# Patient Record
Sex: FEMALE | Race: AMERICAN INDIAN OR ALASKA NATIVE
[De-identification: names, ages, dates, MRNs, and addresses within clinical notes are randomized per-mention and may not be internally consistent; named-entity substitution may affect disease eponyms.]

---

## 2018-02-01 ENCOUNTER — HOSPITAL ENCOUNTER (OUTPATIENT)
Dept: HOSPITAL 5 - CT | Age: 43
Discharge: HOME | End: 2018-02-01
Attending: PAIN MEDICINE
Payer: MEDICARE

## 2018-02-01 DIAGNOSIS — R91.8: ICD-10-CM

## 2018-02-01 DIAGNOSIS — M54.2: ICD-10-CM

## 2018-02-01 DIAGNOSIS — M54.16: ICD-10-CM

## 2018-02-01 DIAGNOSIS — M47.894: Primary | ICD-10-CM

## 2018-02-01 PROCEDURE — 72131 CT LUMBAR SPINE W/O DYE: CPT

## 2018-02-01 PROCEDURE — 72128 CT CHEST SPINE W/O DYE: CPT

## 2018-02-01 PROCEDURE — 72125 CT NECK SPINE W/O DYE: CPT

## 2018-02-01 NOTE — CAT SCAN REPORT
FINAL REPORT



PROCEDURE:  CT CERVICAL SPINE WO CON



TECHNIQUE:  Computerized tomography of the cervical spine was

performed from the skull base to T1 without contrast material. 



HISTORY:  CERVICALGIA 



COMPARISON:  No prior studies are available for comparison.



FINDINGS:  

Vertebral alignment and height are within normal limits.

Visualized lung apices are clear. Soft tissues of the neck are

unremarkable. 



C1-2: No significant abnormality.



C2-3: No significant abnormality.



C3-4: No significant abnormality.



C4-5: No significant abnormality.



C5-6: No significant abnormality.



C6-7: No significant abnormality.



C7-T1: No significant abnormality.



Other: No additional findings.



IMPRESSION:  

No acute fracture.



Unremarkable cervical spine..

## 2018-02-01 NOTE — CAT SCAN REPORT
FINAL REPORT



PROCEDURE:  CT THORACIC SPINE WO CON



TECHNIQUE:  Computerized axial tomography of the thoracic spine

was performed from C7 - L1 without contrast material. .68

mGy-cm.



HISTORY:  Pain thoracic spine. 



COMPARISON:  No prior studies are available for comparison.



FINDINGS:  

Normal alignment. Tiny multilevel osteophytes. Intrathecal leads,

tip at the T10 level. Loop in the mid visualized lead in the

subcutaneous tissues. At the T6-7 level there is minimal

posterior spurring with slight thecal sac effacement. 



2.8 mm right apical ill-defined ground-glass nodule (image 44

series 3). 2 mm left upper lobe nodule (image 55 series 3).

Triangular anterior mediastinal soft tissue attenuation. 



IMPRESSION:  

Mild degenerative changes of the thoracic spine as described

above. Intrathecal leads.



Triangular anterior mediastinal soft tissue attenuation. Consider

reactive thymic tissue, cannot exclude anterior mediastinal

process such as thymoma. There are also indeterminate pulmonary

nodules. Consider dedicated chest CT if there is continued

clinical concern.

## 2018-02-01 NOTE — CAT SCAN REPORT
FINAL REPORT



EXAM:  CT LUMBAR SPINE WO CON



HISTORY:  LUMBAR RADICULOPATHY 



TECHNIQUE:  Axial noncontrast CT images of the lumbosacral spine

were performed. Multiplanar reformats are performed on the

acquisition scanner.



Total exam DLP 1031.79 mGy-cm



Comparison: None



FINDINGS:  

There is normal lumbar lordosis.



There is minimal retrolisthesis L5 on S1.



There are no compression fractures.



Pedicles and facets are intact.



There is a lumbar epidural pain catheter entering the epidural

space posteriorly at T12/L1.



There is no significant degenerative disease.



Axial images were obtained from bottom of T12 to the sacrum.



The epidural catheter is present.



There is no significant canal stenosis or foraminal stenosis at

any level. There is broad-based bulge at L5/S1.



Limited images of the retroperitoneum demonstrate multiple small

retroperitoneal lymph nodes. Normally anteverted uterus

incompletely imaged.



IMPRESSION:  

Lumbar epidural pain catheter present entering at the T12/L1

posterior space.



L5/S1 minimal retrolisthesis with broad-based bulge.



No significant degenerative disease at any level. 



No canal stenosis or foraminal stenosis identified.



No compression fracture.

## 2023-07-14 ENCOUNTER — DASHBOARD ENCOUNTERS (OUTPATIENT)
Age: 48
End: 2023-07-14

## 2023-07-14 ENCOUNTER — TELEPHONE ENCOUNTER (OUTPATIENT)
Dept: URBAN - METROPOLITAN AREA CLINIC 44 | Facility: CLINIC | Age: 48
End: 2023-07-14

## 2023-07-14 ENCOUNTER — OFFICE VISIT (OUTPATIENT)
Dept: URBAN - METROPOLITAN AREA CLINIC 46 | Facility: CLINIC | Age: 48
End: 2023-07-14
Payer: MEDICARE

## 2023-07-14 VITALS
DIASTOLIC BLOOD PRESSURE: 91 MMHG | HEART RATE: 87 BPM | HEIGHT: 62 IN | TEMPERATURE: 97.3 F | SYSTOLIC BLOOD PRESSURE: 144 MMHG | BODY MASS INDEX: 30.69 KG/M2 | WEIGHT: 166.8 LBS

## 2023-07-14 DIAGNOSIS — D50.0 IRON DEFICIENCY ANEMIA DUE TO CHRONIC BLOOD LOSS: ICD-10-CM

## 2023-07-14 DIAGNOSIS — J36 PERITONSILLAR ABSCESS: ICD-10-CM

## 2023-07-14 DIAGNOSIS — R13.10 ODYNOPHAGIA: ICD-10-CM

## 2023-07-14 PROBLEM — 15033003: Status: ACTIVE | Noted: 2023-07-14

## 2023-07-14 PROBLEM — 724556004: Status: ACTIVE | Noted: 2023-07-14

## 2023-07-14 PROBLEM — 30233002: Status: ACTIVE | Noted: 2023-07-14

## 2023-07-14 PROCEDURE — 99204 OFFICE O/P NEW MOD 45 MIN: CPT | Performed by: INTERNAL MEDICINE

## 2023-07-14 RX ORDER — TOPIRAMATE 200 MG/1
1 TABLET TABLET, FILM COATED ORAL ONCE A DAY
Refills: 5 | Status: ACTIVE | COMMUNITY

## 2023-07-14 RX ORDER — HYDROXYCHLOROQUINE SULFATE 200 MG/1
AS DIRECTED TABLET, FILM COATED ORAL
Refills: 1 | Status: ACTIVE | COMMUNITY

## 2023-07-14 RX ORDER — ATORVASTATIN CALCIUM 20 MG/1
1 TABLET TABLET, FILM COATED ORAL ONCE A DAY
Refills: 1 | Status: ACTIVE | COMMUNITY

## 2023-07-14 RX ORDER — FAMOTIDINE 40 MG/5ML
1.25 ML POWDER, FOR SUSPENSION ORAL
Qty: 25 ML | Status: ACTIVE | COMMUNITY

## 2023-07-14 RX ORDER — DOXYCYCLINE HYCLATE 100 MG/1
1 TABLET TABLET ORAL ONCE A DAY
Refills: 0 | Status: ACTIVE | COMMUNITY

## 2023-07-14 RX ORDER — LEVETIRACETAM 100 MG/ML
TAKE 15ML SOLUTION ORAL
Refills: 5 | Status: ACTIVE | COMMUNITY

## 2023-07-14 RX ORDER — FLUCONAZOLE 40 MG/ML
5 ML POWDER, FOR SUSPENSION ORAL ONCE A DAY
Qty: 50 ML | Refills: 0 | OUTPATIENT
Start: 2023-07-14 | End: 2023-07-24

## 2023-07-14 RX ORDER — MELOXICAM 15 MG/1
1 TABLET TABLET ORAL ONCE A DAY
Refills: 1 | Status: ACTIVE | COMMUNITY

## 2023-07-14 RX ORDER — AMOXICILLIN 875 MG/1
1 TABLET TABLET, FILM COATED ORAL TWICE A DAY
Refills: 0 | Status: ACTIVE | COMMUNITY

## 2023-07-14 RX ORDER — POTASSIUM CHLORIDE 20 MEQ/1
1 TABLET WITH FOOD TABLET, EXTENDED RELEASE ORAL ONCE A DAY
Refills: 1 | Status: ACTIVE | COMMUNITY

## 2023-07-14 RX ORDER — TIZANIDINE 4 MG/1
1 TABLET AS NEEDED TABLET ORAL THREE TIMES A DAY
Refills: 0 | Status: ACTIVE | COMMUNITY

## 2023-07-14 RX ORDER — MORPHINE SULFATE 15 MG/1
1 TABLET TABLET, FILM COATED, EXTENDED RELEASE ORAL
Refills: 0 | Status: ACTIVE | COMMUNITY

## 2023-07-14 RX ORDER — ESCITALOPRAM OXALATE 10 MG/1
1 TABLET TABLET ORAL ONCE A DAY
Refills: 1 | Status: ACTIVE | COMMUNITY

## 2023-07-14 RX ORDER — OXYCODONE AND ACETAMINOPHEN 10; 325 MG/1; MG/1
1 TABLET AS NEEDED TABLET ORAL
Refills: 0 | Status: ACTIVE | COMMUNITY

## 2023-07-14 RX ORDER — VENLAFAXINE HYDROCHLORIDE 150 MG/1
1 CAPSULE WITH FOOD CAPSULE, EXTENDED RELEASE ORAL ONCE A DAY
Refills: 1 | Status: ACTIVE | COMMUNITY

## 2023-07-14 RX ORDER — HYDROCODONE BITARTRATE AND ACETAMINOPHEN 7.5; 325 MG/15ML; MG/15ML
5 ML AS NEEDED SOLUTION ORAL
Qty: 60 ML | Refills: 0 | Status: ACTIVE | COMMUNITY

## 2023-07-14 RX ORDER — FAMOTIDINE 40 MG/5ML
5ML POWDER, FOR SUSPENSION ORAL
Qty: 300 ML | Refills: 3 | OUTPATIENT
Start: 2023-07-14

## 2023-07-14 NOTE — HPI-TODAY'S VISIT:
Pt as a hsp follow up from 6/23-6/27/2023 at [facility]  for peritonsillar abscess CT neck 6/22/2023 with 1.7cm peritonsillar abscess; CTA chest wnl. Labs with Hgb 9.8 and MCV 85; WBC 30K. ENT saw and drained.  Received IV abx steroids and disharged with Augmentin and Pepcid liquid. Since hsp discharge has not seen ENT or PCP. Having ongoing odynophagia and nausea and only able to drink liquids and states has to force it down. No emesis. No fevers or chills. Is out of Pepcid suspension and liquid abx.